# Patient Record
Sex: MALE | Race: WHITE | NOT HISPANIC OR LATINO | Employment: FULL TIME | URBAN - METROPOLITAN AREA
[De-identification: names, ages, dates, MRNs, and addresses within clinical notes are randomized per-mention and may not be internally consistent; named-entity substitution may affect disease eponyms.]

---

## 2019-06-03 ENCOUNTER — PATIENT OUTREACH (OUTPATIENT)
Dept: ADMINISTRATIVE | Facility: HOSPITAL | Age: 64
End: 2019-06-03

## 2019-06-04 ENCOUNTER — LAB VISIT (OUTPATIENT)
Dept: LAB | Facility: HOSPITAL | Age: 64
End: 2019-06-04
Payer: COMMERCIAL

## 2019-06-04 ENCOUNTER — OFFICE VISIT (OUTPATIENT)
Dept: INTERNAL MEDICINE | Facility: CLINIC | Age: 64
End: 2019-06-04
Payer: COMMERCIAL

## 2019-06-04 VITALS
TEMPERATURE: 98 F | DIASTOLIC BLOOD PRESSURE: 68 MMHG | HEIGHT: 68 IN | BODY MASS INDEX: 22.35 KG/M2 | WEIGHT: 147.5 LBS | SYSTOLIC BLOOD PRESSURE: 120 MMHG | HEART RATE: 82 BPM

## 2019-06-04 DIAGNOSIS — R35.0 URINARY FREQUENCY: ICD-10-CM

## 2019-06-04 DIAGNOSIS — R53.82 CHRONIC FATIGUE: ICD-10-CM

## 2019-06-04 DIAGNOSIS — L60.0 INGROWN TOENAIL OF RIGHT FOOT: ICD-10-CM

## 2019-06-04 DIAGNOSIS — Z76.89 ENCOUNTER TO ESTABLISH CARE: ICD-10-CM

## 2019-06-04 DIAGNOSIS — Z76.89 ENCOUNTER TO ESTABLISH CARE: Primary | ICD-10-CM

## 2019-06-04 LAB
25(OH)D3+25(OH)D2 SERPL-MCNC: 33 NG/ML (ref 30–96)
ALBUMIN SERPL BCP-MCNC: 4.3 G/DL (ref 3.5–5.2)
ALP SERPL-CCNC: 75 U/L (ref 55–135)
ALT SERPL W/O P-5'-P-CCNC: 47 U/L (ref 10–44)
ANION GAP SERPL CALC-SCNC: 8 MMOL/L (ref 8–16)
AST SERPL-CCNC: 34 U/L (ref 10–40)
BASOPHILS # BLD AUTO: 0.06 K/UL (ref 0–0.2)
BASOPHILS NFR BLD: 0.8 % (ref 0–1.9)
BILIRUB SERPL-MCNC: 0.9 MG/DL (ref 0.1–1)
BILIRUB SERPL-MCNC: NEGATIVE MG/DL
BLOOD URINE, POC: NEGATIVE
BNP SERPL-MCNC: 15 PG/ML (ref 0–99)
BUN SERPL-MCNC: 25 MG/DL (ref 8–23)
CALCIUM SERPL-MCNC: 10.6 MG/DL (ref 8.7–10.5)
CHLORIDE SERPL-SCNC: 100 MMOL/L (ref 95–110)
CHOLEST SERPL-MCNC: 237 MG/DL (ref 120–199)
CHOLEST/HDLC SERPL: 6.2 {RATIO} (ref 2–5)
CO2 SERPL-SCNC: 30 MMOL/L (ref 23–29)
COLOR, POC UA: NORMAL
COMPLEXED PSA SERPL-MCNC: 3.1 NG/ML (ref 0–4)
CREAT SERPL-MCNC: 1.2 MG/DL (ref 0.5–1.4)
CRP SERPL-MCNC: 2.3 MG/L (ref 0–8.2)
DIFFERENTIAL METHOD: ABNORMAL
EOSINOPHIL # BLD AUTO: 0.3 K/UL (ref 0–0.5)
EOSINOPHIL NFR BLD: 4.4 % (ref 0–8)
ERYTHROCYTE [DISTWIDTH] IN BLOOD BY AUTOMATED COUNT: 13.1 % (ref 11.5–14.5)
ERYTHROCYTE [SEDIMENTATION RATE] IN BLOOD BY WESTERGREN METHOD: 17 MM/HR (ref 0–23)
EST. GFR  (AFRICAN AMERICAN): >60 ML/MIN/1.73 M^2
EST. GFR  (NON AFRICAN AMERICAN): >60 ML/MIN/1.73 M^2
GLUCOSE SERPL-MCNC: 95 MG/DL (ref 70–110)
GLUCOSE UR QL STRIP: NORMAL
HCT VFR BLD AUTO: 39.7 % (ref 40–54)
HDLC SERPL-MCNC: 38 MG/DL (ref 40–75)
HDLC SERPL: 16 % (ref 20–50)
HGB BLD-MCNC: 13.1 G/DL (ref 14–18)
IMM GRANULOCYTES # BLD AUTO: 0.04 K/UL (ref 0–0.04)
IMM GRANULOCYTES NFR BLD AUTO: 0.5 % (ref 0–0.5)
KETONES UR QL STRIP: NEGATIVE
LDLC SERPL CALC-MCNC: 164.2 MG/DL (ref 63–159)
LEUKOCYTE ESTERASE URINE, POC: NEGATIVE
LYMPHOCYTES # BLD AUTO: 2.1 K/UL (ref 1–4.8)
LYMPHOCYTES NFR BLD: 26.5 % (ref 18–48)
MCH RBC QN AUTO: 29.7 PG (ref 27–31)
MCHC RBC AUTO-ENTMCNC: 33 G/DL (ref 32–36)
MCV RBC AUTO: 90 FL (ref 82–98)
MONOCYTES # BLD AUTO: 0.8 K/UL (ref 0.3–1)
MONOCYTES NFR BLD: 10 % (ref 4–15)
NEUTROPHILS # BLD AUTO: 4.5 K/UL (ref 1.8–7.7)
NEUTROPHILS NFR BLD: 57.8 % (ref 38–73)
NITRITE, POC UA: NEGATIVE
NONHDLC SERPL-MCNC: 199 MG/DL
NRBC BLD-RTO: 0 /100 WBC
PH, POC UA: 9
PLATELET # BLD AUTO: 245 K/UL (ref 150–350)
PMV BLD AUTO: 11.1 FL (ref 9.2–12.9)
POTASSIUM SERPL-SCNC: 4.1 MMOL/L (ref 3.5–5.1)
PROT SERPL-MCNC: 8.1 G/DL (ref 6–8.4)
PROTEIN, POC: NORMAL
RBC # BLD AUTO: 4.41 M/UL (ref 4.6–6.2)
SODIUM SERPL-SCNC: 138 MMOL/L (ref 136–145)
SPECIFIC GRAVITY, POC UA: 1
T4 FREE SERPL-MCNC: 0.96 NG/DL (ref 0.71–1.51)
TRIGL SERPL-MCNC: 174 MG/DL (ref 30–150)
TSH SERPL DL<=0.005 MIU/L-ACNC: 2.36 UIU/ML (ref 0.4–4)
UROBILINOGEN, POC UA: NORMAL
WBC # BLD AUTO: 7.81 K/UL (ref 3.9–12.7)

## 2019-06-04 PROCEDURE — 99999 PR PBB SHADOW E&M-EST. PATIENT-LVL III: ICD-10-PCS | Mod: PBBFAC,,, | Performed by: INTERNAL MEDICINE

## 2019-06-04 PROCEDURE — 81001 POCT URINALYSIS, DIPSTICK OR TABLET REAGENT, AUTOMATED, WITH MICROSCOP: ICD-10-PCS | Mod: S$GLB,,, | Performed by: INTERNAL MEDICINE

## 2019-06-04 PROCEDURE — 86803 HEPATITIS C AB TEST: CPT

## 2019-06-04 PROCEDURE — 83880 ASSAY OF NATRIURETIC PEPTIDE: CPT

## 2019-06-04 PROCEDURE — 82040 ASSAY OF SERUM ALBUMIN: CPT

## 2019-06-04 PROCEDURE — 36415 COLL VENOUS BLD VENIPUNCTURE: CPT | Mod: PO

## 2019-06-04 PROCEDURE — 84443 ASSAY THYROID STIM HORMONE: CPT

## 2019-06-04 PROCEDURE — 99999 PR PBB SHADOW E&M-EST. PATIENT-LVL III: CPT | Mod: PBBFAC,,, | Performed by: INTERNAL MEDICINE

## 2019-06-04 PROCEDURE — 84439 ASSAY OF FREE THYROXINE: CPT

## 2019-06-04 PROCEDURE — 99204 OFFICE O/P NEW MOD 45 MIN: CPT | Mod: 25,S$GLB,, | Performed by: INTERNAL MEDICINE

## 2019-06-04 PROCEDURE — 82306 VITAMIN D 25 HYDROXY: CPT

## 2019-06-04 PROCEDURE — 85652 RBC SED RATE AUTOMATED: CPT

## 2019-06-04 PROCEDURE — 99204 PR OFFICE/OUTPT VISIT, NEW, LEVL IV, 45-59 MIN: ICD-10-PCS | Mod: 25,S$GLB,, | Performed by: INTERNAL MEDICINE

## 2019-06-04 PROCEDURE — 84153 ASSAY OF PSA TOTAL: CPT

## 2019-06-04 PROCEDURE — 80061 LIPID PANEL: CPT

## 2019-06-04 PROCEDURE — 86140 C-REACTIVE PROTEIN: CPT

## 2019-06-04 PROCEDURE — 87086 URINE CULTURE/COLONY COUNT: CPT

## 2019-06-04 PROCEDURE — 85025 COMPLETE CBC W/AUTO DIFF WBC: CPT

## 2019-06-04 PROCEDURE — 80053 COMPREHEN METABOLIC PANEL: CPT

## 2019-06-04 PROCEDURE — 81001 URINALYSIS AUTO W/SCOPE: CPT | Mod: S$GLB,,, | Performed by: INTERNAL MEDICINE

## 2019-06-04 RX ORDER — COD LIVER OIL
OIL (ML) ORAL 2 TIMES DAILY
COMMUNITY
End: 2020-01-06

## 2019-06-04 NOTE — PROGRESS NOTES
Subjective:      Patient ID: Aleksandr Singh is a 64 y.o. male.    Chief Complaint: Establish Care      Mr. Aleksandr Singh is a patient of Brown Nicholson Jr, MD, who presents to [facility] primary care.    HPI    He reports having an ingrown toenail on bilateral feet, but on the right it broke the skin.      Past Medical History:   Diagnosis Date    Cholesteatoma of attic, left ear     Hearing loss     left    History of prostatitis     Otitis media     s/p middle ear removed     Seasonal allergies     post-nasal drip; controlled with Claritin; avoids dairy & wheat     Past Surgical History:   Procedure Laterality Date    ADENOIDECTOMY Bilateral 1975    MASTOID SURGERY Left 1976    TONSILLECTOMY Bilateral 1975     Social History     Socioeconomic History    Marital status: Single     Spouse name: Not on file    Number of children: Not on file    Years of education: Not on file    Highest education level: Not on file   Occupational History    Not on file   Social Needs    Financial resource strain: Not on file    Food insecurity:     Worry: Not on file     Inability: Not on file    Transportation needs:     Medical: Not on file     Non-medical: Not on file   Tobacco Use    Smoking status: Never Smoker    Smokeless tobacco: Never Used   Substance and Sexual Activity    Alcohol use: Not Currently     Alcohol/week: 0.6 oz     Types: 1 Glasses of wine per week     Frequency: Monthly or less     Comment: occasional     Drug use: No    Sexual activity: Not Currently   Lifestyle    Physical activity:     Days per week: Not on file     Minutes per session: Not on file    Stress: Not at all   Relationships    Social connections:     Talks on phone: Not on file     Gets together: Not on file     Attends Scientologist service: Not on file     Active member of club or organization: Not on file     Attends meetings of clubs or organizations: Not on file     Relationship status: Not on file   Other Topics Concern     Not on file   Social History Narrative    Health Goals: Increase energy, started anti-fungal diet due to plaque psoriasis, for which he takes Cod liver oil        Breakfast: Smoothies: fruit, banana    Lunch: Lentils (home made) with turkey, spices or beans (red); water (previously black tea)    Dinner: Chicken salad or gyro at [employer]; water    Snacks: None    Eating out: 3x/wk    Water (oz/day): 24 oz/d    Physical Activity: None     Goals     None        Family History   Problem Relation Age of Onset    Melanoma Father     Heart failure Brother         s/p CABG x3    Skin cancer Brother     Heart attack Brother 67       Current Outpatient Medications:     cod liver oil Oil, Take by mouth 2 (two) times daily., Disp: , Rfl:     Lactobacillus rhamnosus GG (CULTURELLE) 10 billion cell capsule, Take 1 capsule by mouth once daily., Disp: , Rfl:     Review of patient's allergies indicates:   Allergen Reactions    Keflex [cephalexin] Shortness Of Breath    Terbinafine Palpitations       Review of Systems   Constitutional: Negative for activity change and unexpected weight change.   HENT: Negative for hearing loss, rhinorrhea and trouble swallowing.    Eyes: Negative for discharge and visual disturbance.   Respiratory: Negative for chest tightness and wheezing.    Cardiovascular: Negative for chest pain and palpitations.   Gastrointestinal: Negative for blood in stool, constipation, diarrhea and vomiting.   Endocrine: Positive for polyuria. Negative for polydipsia.   Genitourinary: Positive for difficulty urinating and urgency. Negative for hematuria.   Musculoskeletal: Negative for arthralgias, joint swelling and neck pain.   Neurological: Negative for weakness and headaches.   Psychiatric/Behavioral: Negative for confusion and dysphoric mood.      All remaining systems negative    Objective:     /68 (BP Location: Left arm, Patient Position: Sitting, BP Method: Medium (Automatic))   Pulse 82    "Temp 98 °F (36.7 °C) (Tympanic)   Ht 5' 8" (1.727 m)   Wt 66.9 kg (147 lb 7.8 oz)   BMI 22.43 kg/m²     Physical Exam  GEN: A&O fully, NAD  PSYC: Normal affect  EXT: Right 1st tarsal with cut away wedge and with erythematous, edematous flesh underneath, no cellulitis of surrounding area      Assessment:      1. Encounter to establish care    2. Chronic fatigue: Improving with dietary modifications.     3. Ingrown toenail of right foot: Will refer to podiatry for possible surgery.        Plan:   Encounter to establish care  -     Testosterone Panel; Future; Expected date: 06/04/2019  -     CBC auto differential; Future; Expected date: 06/04/2019  -     Comprehensive metabolic panel; Future; Expected date: 06/04/2019  -     C-reactive protein; Future; Expected date: 06/04/2019  -     Sedimentation rate; Future; Expected date: 06/04/2019  -     Lipid panel; Future; Expected date: 06/04/2019  -     Vitamin D; Future; Expected date: 06/04/2019  -     Brain natriuretic peptide; Future; Expected date: 06/04/2019  -     T4, free; Future; Expected date: 06/04/2019  -     TSH; Future; Expected date: 06/04/2019  -     Hepatitis C antibody; Future; Expected date: 06/04/2019  -     PSA, Screening; Future; Expected date: 06/04/2019  -     Case request GI: COLONOSCOPY    Chronic fatigue    Ingrown toenail of right foot  -     Ambulatory consult to Podiatry        Follow up in about 3 months (around 9/4/2019), or if symptoms worsen or fail to improve, for FU on fatigue.      I spent 45 minutes of time with patient 50% or more of which was discussing labs and plans of care.  "

## 2019-06-05 LAB — HCV AB SERPL QL IA: NEGATIVE

## 2019-06-06 DIAGNOSIS — E83.52 HYPERCALCEMIA: Primary | ICD-10-CM

## 2019-06-06 DIAGNOSIS — D64.9 ANEMIA, UNSPECIFIED TYPE: ICD-10-CM

## 2019-06-06 PROBLEM — E78.5 DYSLIPIDEMIA (HIGH LDL; LOW HDL): Status: ACTIVE | Noted: 2019-06-06

## 2019-06-06 LAB — BACTERIA UR CULT: NO GROWTH

## 2019-06-10 ENCOUNTER — TELEPHONE (OUTPATIENT)
Dept: INTERNAL MEDICINE | Facility: CLINIC | Age: 64
End: 2019-06-10

## 2019-06-10 LAB
ALBUMIN SERPL-MCNC: 4.8 G/DL (ref 3.6–5.1)
SHBG SERPL-SCNC: 35 NMOL/L (ref 22–77)
TESTOST FREE SERPL-MCNC: 47 PG/ML (ref 46–224)
TESTOST SERPL-MCNC: 386 NG/DL (ref 250–1100)
TESTOSTERONE.FREE+WB SERPL-MCNC: 102.9 NG/DL (ref 110–575)

## 2019-06-10 NOTE — TELEPHONE ENCOUNTER
----- Message from Liza Mcclellan sent at 6/10/2019 10:05 AM CDT -----  Type:  Patient Returning Call    Who Called: Kang Brandon  Who Left Message for Patient:  Dr Skinner's office  Does the patient know what this is regarding?:   Would the patient rather a call back or a response via [facility]?  Call back  Best Call Back Number: [phone]  Additional Information:  Please call again//[email or web address]

## 2019-06-25 ENCOUNTER — OFFICE VISIT (OUTPATIENT)
Dept: PODIATRY | Facility: CLINIC | Age: 64
End: 2019-06-25
Payer: COMMERCIAL

## 2019-06-25 VITALS
SYSTOLIC BLOOD PRESSURE: 108 MMHG | HEART RATE: 72 BPM | HEIGHT: 68 IN | DIASTOLIC BLOOD PRESSURE: 70 MMHG | BODY MASS INDEX: 22.49 KG/M2 | WEIGHT: 148.38 LBS

## 2019-06-25 DIAGNOSIS — L60.0 ONYCHOCRYPTOSIS: ICD-10-CM

## 2019-06-25 DIAGNOSIS — L03.039 ACUTE PARONYCHIA OF TOE, UNSPECIFIED LATERALITY: Primary | ICD-10-CM

## 2019-06-25 PROCEDURE — 99999 PR PBB SHADOW E&M-EST. PATIENT-LVL III: CPT | Mod: PBBFAC,,, | Performed by: PODIATRIST

## 2019-06-25 PROCEDURE — 99203 OFFICE O/P NEW LOW 30 MIN: CPT | Mod: S$GLB,,, | Performed by: PODIATRIST

## 2019-06-25 PROCEDURE — 99203 PR OFFICE/OUTPT VISIT, NEW, LEVL III, 30-44 MIN: ICD-10-PCS | Mod: S$GLB,,, | Performed by: PODIATRIST

## 2019-06-25 PROCEDURE — 99999 PR PBB SHADOW E&M-EST. PATIENT-LVL III: ICD-10-PCS | Mod: PBBFAC,,, | Performed by: PODIATRIST

## 2019-06-25 NOTE — LETTER
June 30, 2019      Lito Skinner MD  [address]  [place] [address]           [facility]  [address] [place] [address]  Phone: [phone]  Fax: [phone]          Patient: Aleksandr Singh   MR Number: [id number]   YOB: 1955   Date of Visit: 6/25/2019       Dear Dr. Lito Skinner:    Thank you for referring Aleksandr Singh to me for evaluation. Attached you will find relevant portions of my assessment and plan of care.    If you have questions, please do not hesitate to call me. I look forward to following Aleksandr Singh along with you.    Sincerely,    Vlad Penaloza DPM    Enclosure  CC:  No Recipients    If you would like to receive this communication electronically, please contact [email or web address] or [phone] to request more information on [facility] access.    For providers and/or their staff who would like to refer a patient to [facility], please contact us through our one-stop-shop provider referral line, [facility], at [phone].    If you feel you have received this communication in error or would no longer like to receive these types of communications, please e-mail [email or web address]

## 2019-06-30 NOTE — PROGRESS NOTES
Subjective:      Patient ID: Aleksandr Singh is a 64 y.o. male.    Chief Complaint: Ingrown Toenail (Ingrown toenail to bilat hallux,  wears tennis shoes, pain 0/10, ambulatory without assistance, non-diabetic, PCP: Lyn Ford  )    Aleksandr is a 64 y.o. male who presents to the clinic complaining of painful ingrown toenail on both feet. Patient states the nail feels like its digging into the side of his skin. Patient states the pain has been present for several months. Patient does not relate a history of trauma. Patient rates pain 0/10. When asked to indicate where the pain is located, patient points to medial bilateral nail border. Patient denies other complaints at this time.      Patient Active Problem List   Diagnosis    Hypercalcemia    Elevated BUN    Anemia    Dyslipidemia (high LDL; low HDL)       Medication List with Changes/Refills   Current Medications    COD LIVER OIL OIL    Take by mouth 2 (two) times daily.    LACTOBACILLUS RHAMNOSUS GG (CULTURELLE) 10 BILLION CELL CAPSULE    Take 1 capsule by mouth once daily.       Review of patient's allergies indicates:   Allergen Reactions    Keflex [cephalexin] Shortness Of Breath    Terbinafine Palpitations       Past Surgical History:   Procedure Laterality Date    ADENOIDECTOMY Bilateral 1975    MASTOID SURGERY Left 1976    TONSILLECTOMY Bilateral 1975       Family History   Problem Relation Age of Onset    Melanoma Father     Heart failure Brother         s/p CABG x3    Skin cancer Brother     Heart attack Brother 67       Social History     Socioeconomic History    Marital status: Single     Spouse name: Not on file    Number of children: Not on file    Years of education: Not on file    Highest education level: Not on file   Occupational History    Not on file   Social Needs    Financial resource strain: Not on file    Food insecurity:     Worry: Not on file     Inability: Not on file    Transportation needs:     Medical: Not on file      "Non-medical: Not on file   Tobacco Use    Smoking status: Never Smoker    Smokeless tobacco: Never Used   Substance and Sexual Activity    Alcohol use: Not Currently     Alcohol/week: 0.6 oz     Types: 1 Glasses of wine per week     Frequency: Monthly or less     Comment: occasional     Drug use: No    Sexual activity: Not Currently   Lifestyle    Physical activity:     Days per week: Not on file     Minutes per session: Not on file    Stress: Not at all   Relationships    Social connections:     Talks on phone: Not on file     Gets together: Not on file     Attends Hindu service: Not on file     Active member of club or organization: Not on file     Attends meetings of clubs or organizations: Not on file     Relationship status: Not on file   Other Topics Concern    Not on file   Social History Narrative    Health Goals: Increase energy, started anti-fungal diet due to plaque psoriasis, for which he takes Cod liver oil        Breakfast: Smoothies: fruit, banana    Lunch: Lentils (home made) with turkey, spices or beans (red); water (previously black tea)    Dinner: Chicken salad or gyro at [employer]; water    Snacks: None    Eating out: 3x/wk    Water (oz/day): 24 oz/d    Physical Activity: None       Vitals:    06/25/19 1602   BP: 108/70   Pulse: 72   Weight: 67.3 kg (148 lb 5.9 oz)   Height: 5' 8" (1.727 m)   PainSc: 0-No pain       Review of Systems   Constitutional: Negative for chills and fever.   Respiratory: Negative for shortness of breath.    Cardiovascular: Negative for chest pain, palpitations, orthopnea, claudication and leg swelling.   Gastrointestinal: Negative for diarrhea, nausea and vomiting.   Musculoskeletal: Negative for joint pain.   Skin: Negative for rash.   Neurological: Negative for dizziness, tingling, sensory change, focal weakness and weakness.   Psychiatric/Behavioral: Negative.          Objective:   PHYSICAL EXAM: Apperance: Alert and orient in no distress,well " developed, and with good attention to grooming and body habits  Lower Extremity Exam   VASCULAR: Dorsalis pedis pulses 2/4 bilateral and Posterior Tibial pulses 2/4 bilateral. Capillary fill time <4 seconds bilateral. No edema observed bilateral. Varicosities absent bilateral. Skin temperature of the lower extremities is warm to warm, proximal to distal. Hair growth WNL bilateral.  DERMATOLOGICAL: No skin rash, subcutaneous nodules, lesions or ulcers observed. Bilateral hallux nail observed to be mildly incurvated at medial borders and slight obstructed in the nail grooves with soft tissue. The bilateral hallux medial nail fold is grossly edematous and firm from chronic inflammation and scarring. No purulent drainage, no odor, and no increased temperature observed to bilateral hallux.   NEUROLOGICAL: Light touch, sharp-dull, proprioception all present and equal bilaterally.    MUSCULOSKELETAL: Muscle strength 5/5 for all foot inverters, everters, plantarflexors, and  dorsiflexors bilateral. No pain on palpation of bilateral hallux medial nail border. No pain on palpation of dorsal nail plate bilateral hallux.         Assessment:       Acute paronychia of toe, unspecified laterality    Onychocryptosis          Plan:   Acute paronychia of toe, unspecified laterality    Onychocryptosis      I counseled the patient on his conditions, regarding findings of my examination, my impressions, and usual treatment plan.   Conservatively we did discuss proper nail cutting for incurvated toenails. Surgically we briefly discussed temporary vs. permanent nail avulsion procedures with patient. The patient elects for conservative management at this time.   Patient to contact office when ready to proceed with nail removal.             Vlad Penaloza DPM  [facility]

## 2019-07-30 PROBLEM — Z12.11 COLON CANCER SCREENING: Status: ACTIVE | Noted: 2019-07-30

## 2019-08-21 ENCOUNTER — PATIENT OUTREACH (OUTPATIENT)
Dept: ADMINISTRATIVE | Facility: HOSPITAL | Age: 64
End: 2019-08-21

## 2019-09-04 ENCOUNTER — LAB VISIT (OUTPATIENT)
Dept: LAB | Facility: HOSPITAL | Age: 64
End: 2019-09-04
Attending: INTERNAL MEDICINE
Payer: COMMERCIAL

## 2019-09-04 ENCOUNTER — OFFICE VISIT (OUTPATIENT)
Dept: INTERNAL MEDICINE | Facility: CLINIC | Age: 64
End: 2019-09-04
Payer: COMMERCIAL

## 2019-09-04 VITALS
TEMPERATURE: 98 F | DIASTOLIC BLOOD PRESSURE: 62 MMHG | BODY MASS INDEX: 22.49 KG/M2 | HEART RATE: 70 BPM | SYSTOLIC BLOOD PRESSURE: 122 MMHG | HEIGHT: 68 IN | OXYGEN SATURATION: 97 % | WEIGHT: 148.38 LBS

## 2019-09-04 DIAGNOSIS — R79.9 ELEVATED BUN: ICD-10-CM

## 2019-09-04 DIAGNOSIS — E78.5 DYSLIPIDEMIA (HIGH LDL; LOW HDL): ICD-10-CM

## 2019-09-04 DIAGNOSIS — D64.9 ANEMIA, UNSPECIFIED TYPE: ICD-10-CM

## 2019-09-04 DIAGNOSIS — E83.52 HYPERCALCEMIA: ICD-10-CM

## 2019-09-04 DIAGNOSIS — R74.01 TRANSAMINITIS: ICD-10-CM

## 2019-09-04 DIAGNOSIS — R74.01 TRANSAMINITIS: Primary | ICD-10-CM

## 2019-09-04 LAB
ALT SERPL W/O P-5'-P-CCNC: 23 U/L (ref 10–44)
ANION GAP SERPL CALC-SCNC: 11 MMOL/L (ref 8–16)
BUN SERPL-MCNC: 23 MG/DL (ref 8–23)
CALCIUM SERPL-MCNC: 9.6 MG/DL (ref 8.7–10.5)
CHLORIDE SERPL-SCNC: 102 MMOL/L (ref 95–110)
CHOLEST SERPL-MCNC: 210 MG/DL (ref 120–199)
CHOLEST/HDLC SERPL: 5.1 {RATIO} (ref 2–5)
CO2 SERPL-SCNC: 27 MMOL/L (ref 23–29)
CREAT SERPL-MCNC: 0.9 MG/DL (ref 0.5–1.4)
ERYTHROCYTE [DISTWIDTH] IN BLOOD BY AUTOMATED COUNT: 13.4 % (ref 11.5–14.5)
EST. GFR  (AFRICAN AMERICAN): >60 ML/MIN/1.73 M^2
EST. GFR  (NON AFRICAN AMERICAN): >60 ML/MIN/1.73 M^2
GLUCOSE SERPL-MCNC: 78 MG/DL (ref 70–110)
HCT VFR BLD AUTO: 40.9 % (ref 40–54)
HDLC SERPL-MCNC: 41 MG/DL (ref 40–75)
HDLC SERPL: 19.5 % (ref 20–50)
HGB BLD-MCNC: 13.3 G/DL (ref 14–18)
IRON SERPL-MCNC: 84 UG/DL (ref 45–160)
LDLC SERPL CALC-MCNC: 144 MG/DL (ref 63–159)
MCH RBC QN AUTO: 29 PG (ref 27–31)
MCHC RBC AUTO-ENTMCNC: 32.5 G/DL (ref 32–36)
MCV RBC AUTO: 89 FL (ref 82–98)
NONHDLC SERPL-MCNC: 169 MG/DL
PLATELET # BLD AUTO: 218 K/UL (ref 150–350)
PMV BLD AUTO: 11.3 FL (ref 9.2–12.9)
POTASSIUM SERPL-SCNC: 3.7 MMOL/L (ref 3.5–5.1)
RBC # BLD AUTO: 4.58 M/UL (ref 4.6–6.2)
SATURATED IRON: 20 % (ref 20–50)
SODIUM SERPL-SCNC: 140 MMOL/L (ref 136–145)
TOTAL IRON BINDING CAPACITY: 422 UG/DL (ref 250–450)
TRANSFERRIN SERPL-MCNC: 285 MG/DL (ref 200–375)
TRIGL SERPL-MCNC: 125 MG/DL (ref 30–150)
WBC # BLD AUTO: 8.37 K/UL (ref 3.9–12.7)

## 2019-09-04 PROCEDURE — 83540 ASSAY OF IRON: CPT

## 2019-09-04 PROCEDURE — 99999 PR PBB SHADOW E&M-EST. PATIENT-LVL III: CPT | Mod: PBBFAC,,, | Performed by: INTERNAL MEDICINE

## 2019-09-04 PROCEDURE — 99214 OFFICE O/P EST MOD 30 MIN: CPT | Mod: S$GLB,,, | Performed by: INTERNAL MEDICINE

## 2019-09-04 PROCEDURE — 99999 PR PBB SHADOW E&M-EST. PATIENT-LVL III: ICD-10-PCS | Mod: PBBFAC,,, | Performed by: INTERNAL MEDICINE

## 2019-09-04 PROCEDURE — 36415 COLL VENOUS BLD VENIPUNCTURE: CPT | Mod: PO

## 2019-09-04 PROCEDURE — 80048 BASIC METABOLIC PNL TOTAL CA: CPT

## 2019-09-04 PROCEDURE — 85027 COMPLETE CBC AUTOMATED: CPT

## 2019-09-04 PROCEDURE — 99214 PR OFFICE/OUTPT VISIT, EST, LEVL IV, 30-39 MIN: ICD-10-PCS | Mod: S$GLB,,, | Performed by: INTERNAL MEDICINE

## 2019-09-04 PROCEDURE — 83970 ASSAY OF PARATHORMONE: CPT

## 2019-09-04 PROCEDURE — 84460 ALANINE AMINO (ALT) (SGPT): CPT

## 2019-09-04 PROCEDURE — 80061 LIPID PANEL: CPT

## 2019-09-04 PROCEDURE — 82728 ASSAY OF FERRITIN: CPT

## 2019-09-04 NOTE — PROGRESS NOTES
Subjective:      Patient ID: Aleksandr Singh is a 64 y.o. male.    Chief Complaint: Follow-up      HPI     MrFelipe Singh is a patient of Brown Nicholson Jr, MD, who presents for Follow-up    on chronic fatigue. He continues to have occasional muscle soreness and joint pains in various areas.     He reports an improvement in his fatigue ever since cutting back on refined carbs and dairy. His sinuses, skin and enlarged prostate have also improved since. He plans to start cross-fit in October.     VS, labs & imaging reviewed and discussed with patient, including Ca 10.6, BUN 25, ALT 47, , HDL 38, .2, H&H 13.1/39.7, MCV 90, T/BNP/TFTs all WNL on 6/4/19. He reports taking Cod liver oil.     Of note, EPIC indicates due preventive measures, including shingles & colonoscopy.       Past Medical History:   Diagnosis Date    Anemia     mild; will w/u    Cholesteatoma of attic, left ear     Dyslipidemia (high LDL; low HDL) 06/06/2019    Elevated BUN     Hearing loss     left    History of prostatitis     Hypercalcemia     Otitis media     s/p middle ear removed     Seasonal allergies     post-nasal drip; controlled with Claritin; avoids dairy & wheat     Past Surgical History:   Procedure Laterality Date    ADENOIDECTOMY Bilateral 1975    MASTOID SURGERY Left 1976    TONSILLECTOMY Bilateral 1975     Social History     Socioeconomic History    Marital status: Single     Spouse name: Not on file    Number of children: Not on file    Years of education: Not on file    Highest education level: Not on file   Occupational History    Not on file   Social Needs    Financial resource strain: Not on file    Food insecurity:     Worry: Not on file     Inability: Not on file    Transportation needs:     Medical: Not on file     Non-medical: Not on file   Tobacco Use    Smoking status: Never Smoker    Smokeless tobacco: Never Used   Substance and Sexual Activity    Alcohol use: Not Currently      "Alcohol/week: 0.6 oz     Types: 1 Glasses of wine per week     Frequency: Monthly or less     Comment: occasional     Drug use: No    Sexual activity: Not Currently   Lifestyle    Physical activity:     Days per week: Not on file     Minutes per session: Not on file    Stress: Not at all   Relationships    Social connections:     Talks on phone: Not on file     Gets together: Not on file     Attends Druze service: Not on file     Active member of club or organization: Not on file     Attends meetings of clubs or organizations: Not on file     Relationship status: Not on file   Other Topics Concern    Not on file   Social History Narrative    Health Goals: Increase energy, started anti-fungal diet due to plaque psoriasis, for which he takes Cod liver oil        Breakfast: Smoothies: fruit, banana    Lunch: Lentils (home made) with turkey, spices or beans (red); water (previously black tea)    Dinner: Chicken salad or gyro at [employer]; water    Snacks: None    Eating out: 3x/wk    Water (oz/day): 24 oz/d    Physical Activity: None     Family History   Problem Relation Age of Onset    Melanoma Father     Heart failure Brother         s/p CABG x3    Skin cancer Brother     Heart attack Brother 67       Current Outpatient Medications:     cod liver oil Oil, Take by mouth 2 (two) times daily., Disp: , Rfl:     Lactobacillus rhamnosus GG (CULTURELLE) 10 billion cell capsule, Take 1 capsule by mouth once daily., Disp: , Rfl:     Review of patient's allergies indicates:   Allergen Reactions    Keflex [cephalexin] Shortness Of Breath    Terbinafine Palpitations        Review of Systems   All remaining systems negative    Objective:     /62 (BP Location: Left arm, Patient Position: Sitting, BP Method: Large (Manual))   Pulse 70   Temp 98.3 °F (36.8 °C) (Temporal)   Ht 5' 8" (1.727 m)   Wt 67.3 kg (148 lb 5.9 oz)   SpO2 97%   BMI 22.56 kg/m²     Physical Exam  GEN: A&O fully, NAD  PSYC: " Normal affect      Lab Results   Component Value Date    WBC 7.81 06/04/2019    HGB 13.1 (L) 06/04/2019    HCT 39.7 (L) 06/04/2019     06/04/2019    CHOL 237 (H) 06/04/2019    TRIG 174 (H) 06/04/2019    HDL 38 (L) 06/04/2019    LDLCALC 164.2 (H) 06/04/2019    ALT 47 (H) 06/04/2019    AST 34 06/04/2019     06/04/2019    K 4.1 06/04/2019     06/04/2019    CREATININE 1.2 06/04/2019    BUN 25 (H) 06/04/2019    CO2 30 (H) 06/04/2019    CALCIUM 10.6 (H) 06/04/2019    PSA 3.1 06/04/2019       Assessment:      1. Transaminitis: Mild. Likely 2/2 previous fast foods, which he has eliminated. Will recheck today.    2. Hypercalcemia: Mild. May be 2/2 supplementation. Encouraged to avoid all food/drink products & supplements with vitamin D or Ca for now.    3. Dyslipidemia (high LDL; low HDL): Will recheck today. Likely will be improved with good diet.   4. Anemia, unspecified type: Fe studies already ordered, which we will obtain today.    5. Elevated BUN: Likely 2/2 chronic dehydration. He has increased water intake to 64 oz/day. Encouraged to increase further to 80 oz/day.    6. HM: Encouraged shingles & colonoscopy.       Plan:   Transaminitis  -     ALT (SGPT); Future; Expected date: 09/04/2019    Hypercalcemia    Dyslipidemia (high LDL; low HDL)  -     Lipid panel; Future; Expected date: 09/04/2019    Anemia, unspecified type    Elevated BUN      Follow up in about 4 months (around 1/4/2020), or if symptoms worsen or fail to improve, for FU on hypercalcemia.    I spent >25 minutes of time with patient 50% or more of which was discussing labs and plans of care.

## 2019-09-05 DIAGNOSIS — E21.3 HYPERPARATHYROIDISM: Primary | ICD-10-CM

## 2019-09-05 LAB
FERRITIN SERPL-MCNC: 124 NG/ML (ref 20–300)
PTH-INTACT SERPL-MCNC: 83 PG/ML (ref 9–77)

## 2019-09-26 ENCOUNTER — LAB VISIT (OUTPATIENT)
Dept: LAB | Facility: HOSPITAL | Age: 64
End: 2019-09-26
Attending: INTERNAL MEDICINE
Payer: COMMERCIAL

## 2019-09-26 DIAGNOSIS — E21.3 HYPERPARATHYROIDISM: ICD-10-CM

## 2019-09-26 LAB
ANION GAP SERPL CALC-SCNC: 10 MMOL/L (ref 8–16)
BUN SERPL-MCNC: 23 MG/DL (ref 8–23)
CALCIUM SERPL-MCNC: 9.9 MG/DL (ref 8.7–10.5)
CHLORIDE SERPL-SCNC: 100 MMOL/L (ref 95–110)
CO2 SERPL-SCNC: 28 MMOL/L (ref 23–29)
CREAT SERPL-MCNC: 1.1 MG/DL (ref 0.5–1.4)
EST. GFR  (AFRICAN AMERICAN): >60 ML/MIN/1.73 M^2
EST. GFR  (NON AFRICAN AMERICAN): >60 ML/MIN/1.73 M^2
GLUCOSE SERPL-MCNC: 85 MG/DL (ref 70–110)
PHOSPHATE SERPL-MCNC: 4 MG/DL (ref 2.7–4.5)
POTASSIUM SERPL-SCNC: 4 MMOL/L (ref 3.5–5.1)
PTH-INTACT SERPL-MCNC: 50 PG/ML (ref 9–77)
SODIUM SERPL-SCNC: 138 MMOL/L (ref 136–145)

## 2019-09-26 PROCEDURE — 80048 BASIC METABOLIC PNL TOTAL CA: CPT

## 2019-09-26 PROCEDURE — 36415 COLL VENOUS BLD VENIPUNCTURE: CPT | Mod: PO

## 2019-09-26 PROCEDURE — 83970 ASSAY OF PARATHORMONE: CPT

## 2019-09-26 PROCEDURE — 84100 ASSAY OF PHOSPHORUS: CPT

## 2020-01-06 ENCOUNTER — OFFICE VISIT (OUTPATIENT)
Dept: INTERNAL MEDICINE | Facility: CLINIC | Age: 65
End: 2020-01-06
Payer: COMMERCIAL

## 2020-01-06 VITALS
SYSTOLIC BLOOD PRESSURE: 122 MMHG | BODY MASS INDEX: 22.19 KG/M2 | WEIGHT: 146.38 LBS | DIASTOLIC BLOOD PRESSURE: 68 MMHG | HEIGHT: 68 IN | TEMPERATURE: 98 F

## 2020-01-06 DIAGNOSIS — R39.15 URINARY URGENCY: Primary | ICD-10-CM

## 2020-01-06 LAB
BILIRUB SERPL-MCNC: NEGATIVE MG/DL
BLOOD, POC UA: NORMAL
GLUCOSE UR QL STRIP: NORMAL
KETONES UR QL STRIP: NEGATIVE
LEUKOCYTE ESTERASE URINE, POC: NORMAL
NITRITE, POC UA: NEGATIVE
PH, POC UA: 6
PROTEIN, POC: NEGATIVE
SPECIFIC GRAVITY, POC UA: 1.02
UROBILINOGEN, POC UA: NORMAL

## 2020-01-06 PROCEDURE — 81003 URINALYSIS AUTO W/O SCOPE: CPT | Mod: QW,S$GLB,, | Performed by: INTERNAL MEDICINE

## 2020-01-06 PROCEDURE — 99999 PR PBB SHADOW E&M-EST. PATIENT-LVL III: ICD-10-PCS | Mod: PBBFAC,,, | Performed by: INTERNAL MEDICINE

## 2020-01-06 PROCEDURE — 99214 PR OFFICE/OUTPT VISIT, EST, LEVL IV, 30-39 MIN: ICD-10-PCS | Mod: 25,S$GLB,, | Performed by: INTERNAL MEDICINE

## 2020-01-06 PROCEDURE — 87086 URINE CULTURE/COLONY COUNT: CPT

## 2020-01-06 PROCEDURE — 81003 POCT URINALYSIS: ICD-10-PCS | Mod: QW,S$GLB,, | Performed by: INTERNAL MEDICINE

## 2020-01-06 PROCEDURE — 99214 OFFICE O/P EST MOD 30 MIN: CPT | Mod: 25,S$GLB,, | Performed by: INTERNAL MEDICINE

## 2020-01-06 PROCEDURE — 99999 PR PBB SHADOW E&M-EST. PATIENT-LVL III: CPT | Mod: PBBFAC,,, | Performed by: INTERNAL MEDICINE

## 2020-01-06 NOTE — PROGRESS NOTES
Subjective:      Patient ID: Aleksandr Singh is a 64 y.o. male.    Chief Complaint: Follow-up      HPI     Mr. Aleksandr Singh is a patient of Brown Nicholson Jr, MD, who presents for Follow-up    He reports having urinary urgency and pain with urination. No pain in prostate gland region or testicles or scrotum.     VS, labs & imaging reviewed and discussed with patient, including PTH 50 (PTH 83.0 on 9/4/19),  mg/dL, o/w WNL on 9/26/19; PSA 3.1 on 6/4/19.    Ca 10.6, BUN 25, ALT 47, , HDL 38, .2, H&H 13.1/39.7, MCV 90, T/BNP/TFTs all WNL on 6/4/19. He reports taking Cod liver oil.      Of note, EPIC indicates due preventive measures, including shingles & FLU.       Past Medical History:   Diagnosis Date    Anemia     mild; will w/u    Cholesteatoma of attic, left ear     Dyslipidemia (high LDL; low HDL) 06/06/2019    Elevated BUN     Hearing loss     left    History of hypercalcemia 9/29/19    Was found to have high PTH & Ca on a high fast-food diet, all of which normalized with a higher whole foods diet    History of hyperparathyroidism 09/27/2019    Resolved following switch from high fast food to high whole food diet    History of prostatitis     Otitis media     s/p middle ear removed     Seasonal allergies     post-nasal drip; controlled with Claritin; avoids dairy & wheat     Past Surgical History:   Procedure Laterality Date    ADENOIDECTOMY Bilateral 1975    MASTOID SURGERY Left 1976    TONSILLECTOMY Bilateral 1975     Social History     Socioeconomic History    Marital status: Single     Spouse name: Not on file    Number of children: Not on file    Years of education: Not on file    Highest education level: Not on file   Occupational History    Not on file   Social Needs    Financial resource strain: Not on file    Food insecurity:     Worry: Not on file     Inability: Not on file    Transportation needs:     Medical: Not on file     Non-medical: Not on file   Tobacco Use  "   Smoking status: Never Smoker    Smokeless tobacco: Never Used   Substance and Sexual Activity    Alcohol use: Not Currently     Alcohol/week: 1.0 standard drinks     Types: 1 Glasses of wine per week     Frequency: Monthly or less     Comment: occasional     Drug use: No    Sexual activity: Not Currently   Lifestyle    Physical activity:     Days per week: Not on file     Minutes per session: Not on file    Stress: Not at all   Relationships    Social connections:     Talks on phone: Not on file     Gets together: Not on file     Attends Worship service: Not on file     Active member of club or organization: Not on file     Attends meetings of clubs or organizations: Not on file     Relationship status: Not on file   Other Topics Concern    Not on file   Social History Narrative    Health Goals: Increase energy, started anti-fungal diet due to plaque psoriasis, for which he takes Cod liver oil        Breakfast: Smoothies: fruit, banana    Lunch: Lentils (home made) with turkey, spices or beans (red); water (previously black tea)    Dinner: Chicken salad or gyro at [employer]; water    Snacks: None    Eating out: 3x/wk    Water (oz/day): 24 oz/d    Physical Activity: None     Family History   Problem Relation Age of Onset    Melanoma Father     Heart failure Brother         s/p CABG x3    Skin cancer Brother     Heart attack Brother 67       Current Outpatient Medications:     Lactobacillus rhamnosus GG (CULTURELLE) 10 billion cell capsule, Take 1 capsule by mouth once daily., Disp: , Rfl:     Review of patient's allergies indicates:   Allergen Reactions    Keflex [cephalexin] Shortness Of Breath    Terbinafine Palpitations        Review of Systems   All remaining systems negative    Objective:     /68 (BP Location: Left arm, Patient Position: Sitting, BP Method: Medium (Manual))   Temp 98 °F (36.7 °C) (Temporal)   Ht 5' 8" (1.727 m)   Wt 66.4 kg (146 lb 6.2 oz)   BMI 22.26 kg/m² "     Physical Exam  GEN: A&O fully, NAD  PSYC: Normal affect      Lab Results   Component Value Date    WBC 8.37 09/04/2019    HGB 13.3 (L) 09/04/2019    HCT 40.9 09/04/2019     09/04/2019    CHOL 210 (H) 09/04/2019    TRIG 125 09/04/2019    HDL 41 09/04/2019    LDLCALC 144.0 09/04/2019    ALT 23 09/04/2019    AST 34 06/04/2019     09/26/2019    K 4.0 09/26/2019     09/26/2019    CREATININE 1.1 09/26/2019    BUN 23 09/26/2019    CO2 28 09/26/2019    CALCIUM 9.9 09/26/2019    PSA 3.1 06/04/2019       Assessment:      1. Urinary urgency: Likely 2/2 BPH. Will r/o prostatitis/UTI.  Regarding your benign prostatic hypertrophy, I recommend the following dietary modifications:    1. Avoid bread, cereals, eggs, poultry  2. Increase  cooked vegetables, soups, beans, peas, lentils, and citrus fruit  3. Consider sipping on yesika/lemon/honey juice regularly*      *Yesika/lemon/honey Juice          Ingredients (preferably organic & local):    3-5 Yesika roots   3 abner and honey      Preparation:    Yesika root   - Wash   - Chop   - Add to  with an equal proportion of water   - Blend   - Strain   - Pour to fill ¾ of any size container (i.e. glass bottle)    Abner   - Squeeze abner    - Pour juice to fill ¼ of glass bottle    Add honey to glass bottle to taste      Storage & Application    Refrigerate   Enjoy   - Shake & sip every day (also great for cough, congestion, sore throat)   - Avoid drinking >3 oz in one sitting, which can lead to gastrointestinal irritation         Plan:   Urinary urgency      Follow up in about 4 months (around 5/6/2020), or if symptoms worsen or fail to improve, for Annual.    I spent >25 minutes of time with patient 50% or more of which was discussing labs and plans of care.

## 2020-01-07 LAB — BACTERIA UR CULT: NO GROWTH

## 2020-10-23 ENCOUNTER — OFFICE VISIT (OUTPATIENT)
Dept: INTERNAL MEDICINE | Facility: CLINIC | Age: 65
End: 2020-10-23
Payer: COMMERCIAL

## 2020-10-23 ENCOUNTER — TELEPHONE (OUTPATIENT)
Dept: INTERNAL MEDICINE | Facility: CLINIC | Age: 65
End: 2020-10-23

## 2020-10-23 ENCOUNTER — TELEPHONE (OUTPATIENT)
Dept: PEDIATRICS | Facility: CLINIC | Age: 65
End: 2020-10-23

## 2020-10-23 VITALS
DIASTOLIC BLOOD PRESSURE: 66 MMHG | HEIGHT: 68 IN | TEMPERATURE: 98 F | BODY MASS INDEX: 21.77 KG/M2 | WEIGHT: 143.63 LBS | SYSTOLIC BLOOD PRESSURE: 120 MMHG

## 2020-10-23 DIAGNOSIS — R31.9 HEMATURIA, UNSPECIFIED TYPE: Primary | ICD-10-CM

## 2020-10-23 DIAGNOSIS — Z12.11 COLON CANCER SCREENING: ICD-10-CM

## 2020-10-23 DIAGNOSIS — I25.10 CORONARY ARTERY DISEASE INVOLVING NATIVE CORONARY ARTERY OF NATIVE HEART WITHOUT ANGINA PECTORIS: ICD-10-CM

## 2020-10-23 LAB
BILIRUB SERPL-MCNC: NEGATIVE MG/DL
BILIRUB UR QL STRIP: NEGATIVE
BLOOD URINE, POC: NORMAL
CLARITY UR REFRACT.AUTO: CLEAR
CLARITY, POC UA: CLEAR
COLOR UR AUTO: NORMAL
COLOR, POC UA: YELLOW
GLUCOSE UR QL STRIP: NEGATIVE
GLUCOSE UR QL STRIP: NORMAL
HGB UR QL STRIP: NEGATIVE
KETONES UR QL STRIP: NEGATIVE
KETONES UR QL STRIP: NEGATIVE
LEUKOCYTE ESTERASE UR QL STRIP: NEGATIVE
LEUKOCYTE ESTERASE URINE, POC: NORMAL
NITRITE UR QL STRIP: NEGATIVE
NITRITE, POC UA: NEGATIVE
PH UR STRIP: 6 [PH] (ref 5–8)
PH, POC UA: 6
PROT UR QL STRIP: NEGATIVE
PROTEIN, POC: NORMAL
SP GR UR STRIP: 1 (ref 1–1.03)
SPECIFIC GRAVITY, POC UA: 1
URN SPEC COLLECT METH UR: NORMAL
UROBILINOGEN, POC UA: NORMAL

## 2020-10-23 PROCEDURE — 99999 PR PBB SHADOW E&M-EST. PATIENT-LVL III: ICD-10-PCS | Mod: PBBFAC,,, | Performed by: FAMILY MEDICINE

## 2020-10-23 PROCEDURE — 81003 URINALYSIS AUTO W/O SCOPE: CPT

## 2020-10-23 PROCEDURE — 99999 PR PBB SHADOW E&M-EST. PATIENT-LVL III: CPT | Mod: PBBFAC,,, | Performed by: FAMILY MEDICINE

## 2020-10-23 PROCEDURE — 81002 POCT URINE DIPSTICK WITHOUT MICROSCOPE: ICD-10-PCS | Mod: S$GLB,,, | Performed by: FAMILY MEDICINE

## 2020-10-23 PROCEDURE — 99214 OFFICE O/P EST MOD 30 MIN: CPT | Mod: 25,S$GLB,, | Performed by: FAMILY MEDICINE

## 2020-10-23 PROCEDURE — 99214 PR OFFICE/OUTPT VISIT, EST, LEVL IV, 30-39 MIN: ICD-10-PCS | Mod: 25,S$GLB,, | Performed by: FAMILY MEDICINE

## 2020-10-23 PROCEDURE — 81002 URINALYSIS NONAUTO W/O SCOPE: CPT | Mod: S$GLB,,, | Performed by: FAMILY MEDICINE

## 2020-10-23 RX ORDER — ASPIRIN 81 MG/1
81 TABLET ORAL DAILY
COMMUNITY

## 2020-10-23 RX ORDER — TICAGRELOR 90 MG/1
90 TABLET ORAL 2 TIMES DAILY
COMMUNITY
Start: 2020-10-01

## 2020-10-23 RX ORDER — ENALAPRIL MALEATE 5 MG/1
5 TABLET ORAL 2 TIMES DAILY
COMMUNITY
Start: 2020-10-05

## 2020-10-23 RX ORDER — METOPROLOL TARTRATE 25 MG/1
25 TABLET, FILM COATED ORAL 2 TIMES DAILY
COMMUNITY
Start: 2020-10-05

## 2020-10-23 RX ORDER — APIXABAN 5 MG/1
5 TABLET, FILM COATED ORAL 2 TIMES DAILY
COMMUNITY
Start: 2020-10-05

## 2020-10-23 RX ORDER — ROSUVASTATIN CALCIUM 20 MG/1
20 TABLET, COATED ORAL NIGHTLY
COMMUNITY
Start: 2020-10-05

## 2020-10-23 NOTE — TELEPHONE ENCOUNTER
----- Message from Marian Jacome sent at 10/23/2020 10:32 AM CDT -----  Regarding: Requesting an appt for today please  Pt called to schedule an appt for blood in urine for same day appt .        Pt can be reached at [phone].      Thank you!

## 2020-10-23 NOTE — TELEPHONE ENCOUNTER
Spoke with pt he stated he recently had a heart attack on 10/1/20 and was told if he had any blood in his urine/stool to contact his PCP. He was a previous pt of Dr. Skinner; Dr. Skinner informed him that you would be taking over care of his patients therefore he reached out for a same day apt. This morning he woke and isn't sure if blood was in his stool or urine, although he would like to get it checked out to make sure everything is okay. Patient is okay with a virtual. Although your schedule is booked for this afternoon. Please advise if it okay to double book. Pt is waiting for a call back. Thanks

## 2020-10-23 NOTE — TELEPHONE ENCOUNTER
----- Message from Marian Jacome sent at 10/23/2020 10:32 AM CDT -----  Regarding: Requesting an appt for today please  Pt called to schedule an appt for blood in urine for same day appt .        Pt can be reached at [phone].      Thank you!

## 2020-10-23 NOTE — ASSESSMENT & PLAN NOTE
Only trace urine seen today on urine dipstick.  Likely exacerbated by being on anti-platelet regimen.  Will send for microscopic analysis.  If he has this episode again will need to do more diagnostic procedure including possible CT scan.

## 2020-10-23 NOTE — PROGRESS NOTES
Subjective:       Patient ID: Aleksandr Singh is a 65 y.o. male.    Chief Complaint: Hematuria    HPI  Here today with concerns of hematuria.  This morning he woke up and the 1st urination had a lot of blood in the toilet.  At 1st he was unsure whether was related to stool or urine.  Did not have any blood on the toilet paper so did do stented was from the urine.  Since then his urinating about 6 or 7 times and it is all been clear.  Not having any symptoms at all.  No dysuria.  No urinary frequency.  Never had similar issues like this in the past.  Relevant history includes being diagnosed with coronary artery disease on October 1st.  He experienced tightness in chest and was found to have myocardial infarction.  Underwent angiography and 2 stents were placed in the LAD after thrombectomy.  Has been seen Cardiology at [facility].  Currently has a LifeVest secondary to decreased ejection fraction of 35-45%.  He is on aspirin and Brilinta which is new for him    Family History   Problem Relation Age of Onset    Melanoma Father     Heart failure Brother         s/p CABG x3    Skin cancer Brother     Heart attack Brother 67       Current Outpatient Medications:     aspirin (ECOTRIN) 81 MG EC tablet, Take 81 mg by mouth once daily., Disp: , Rfl:     BRILINTA 90 mg tablet, Take 90 mg by mouth 2 (two) times daily., Disp: , Rfl:     enalapril (VASOTEC) 5 MG tablet, Take 5 mg by mouth 2 (two) times daily., Disp: , Rfl:     Lactobacillus rhamnosus GG (CULTURELLE) 10 billion cell capsule, Take 1 capsule by mouth once daily., Disp: , Rfl:     metoprolol tartrate (LOPRESSOR) 25 MG tablet, Take 25 mg by mouth 2 (two) times daily., Disp: , Rfl:     rosuvastatin (CRESTOR) 20 MG tablet, Take 20 mg by mouth nightly., Disp: , Rfl:     ELIQUIS 5 mg Tab, Take 5 mg by mouth 2 (two) times daily., Disp: , Rfl:     Review of Systems   Constitutional: Negative for chills and fever.   Eyes: Negative for visual disturbance.   Respiratory:  "Negative for cough and shortness of breath.    Cardiovascular: Negative for chest pain.   Gastrointestinal: Negative for abdominal pain.   Genitourinary: Positive for hematuria. Negative for difficulty urinating, dysuria, flank pain, frequency, testicular pain and urgency.   Neurological: Negative for dizziness.       Objective:   /66 (BP Location: Right arm, Patient Position: Sitting, BP Method: Large (Manual))   Temp 98.1 °F (36.7 °C) (Temporal)   Ht 5' 8" (1.727 m)   Wt 65.2 kg (143 lb 10.1 oz)   BMI 21.84 kg/m²      Physical Exam  Vitals signs reviewed.   Constitutional:       Appearance: He is well-developed.   HENT:      Head: Normocephalic and atraumatic.   Eyes:      Conjunctiva/sclera: Conjunctivae normal.   Cardiovascular:      Rate and Rhythm: Normal rate.   Pulmonary:      Effort: Pulmonary effort is normal. No respiratory distress.   Skin:     General: Skin is warm and dry.      Findings: No rash.   Neurological:      Mental Status: He is alert and oriented to person, place, and time.      Coordination: Coordination normal.   Psychiatric:         Behavior: Behavior normal.         Assessment & Plan     Problem List Items Addressed This Visit        Cardiac/Vascular    CAD    Overview     S/p stent of LAD on 10/1/20. Follows at [facility]            Renal/    Hematuria - Primary    Current Assessment & Plan     Only trace urine seen today on urine dipstick.  Likely exacerbated by being on anti-platelet regimen.  Will send for microscopic analysis.  If he has this episode again will need to do more diagnostic procedure including possible CT scan.         Relevant Orders    POCT URINE DIPSTICK WITHOUT MICROSCOPE (Completed)    Urinalysis, Reflex to Urine Culture Urine, Clean Catch       GI    Colon cancer screening    Relevant Orders    Cologuard Screening (Multitarget Stool DNA)            No follow-ups on file.    Disclaimer:  This note may have been prepared using voice recognition software, it may " have not been extensively proofed, as such there could be errors within the text such as sound alike errors.

## 2020-11-25 ENCOUNTER — PATIENT OUTREACH (OUTPATIENT)
Dept: ADMINISTRATIVE | Facility: HOSPITAL | Age: 65
End: 2020-11-25

## 2021-04-28 ENCOUNTER — PATIENT MESSAGE (OUTPATIENT)
Dept: RESEARCH | Facility: HOSPITAL | Age: 66
End: 2021-04-28

## 2021-11-03 ENCOUNTER — OFFICE VISIT (OUTPATIENT)
Dept: OTOLARYNGOLOGY | Facility: CLINIC | Age: 66
End: 2021-11-03
Payer: COMMERCIAL

## 2021-11-03 VITALS — TEMPERATURE: 98 F | BODY MASS INDEX: 22.96 KG/M2 | WEIGHT: 151 LBS

## 2021-11-03 DIAGNOSIS — H90.12 CONDUCTIVE HEARING LOSS OF LEFT EAR WITH UNRESTRICTED HEARING OF RIGHT EAR: Primary | ICD-10-CM

## 2021-11-03 PROCEDURE — 99999 PR PBB SHADOW E&M-EST. PATIENT-LVL III: CPT | Mod: PBBFAC,,, | Performed by: OTOLARYNGOLOGY

## 2021-11-03 PROCEDURE — 99999 PR PBB SHADOW E&M-EST. PATIENT-LVL III: ICD-10-PCS | Mod: PBBFAC,,, | Performed by: OTOLARYNGOLOGY

## 2021-11-03 PROCEDURE — 99203 OFFICE O/P NEW LOW 30 MIN: CPT | Mod: S$GLB,,, | Performed by: OTOLARYNGOLOGY

## 2021-11-03 PROCEDURE — 99203 PR OFFICE/OUTPT VISIT, NEW, LEVL III, 30-44 MIN: ICD-10-PCS | Mod: S$GLB,,, | Performed by: OTOLARYNGOLOGY

## 2021-11-03 RX ORDER — AZELASTINE 1 MG/ML
2 SPRAY, METERED NASAL 2 TIMES DAILY
Qty: 30 ML | Refills: 6 | Status: SHIPPED | OUTPATIENT
Start: 2021-11-03 | End: 2022-11-03

## 2021-11-03 RX ORDER — FLUTICASONE PROPIONATE 50 MCG
2 SPRAY, SUSPENSION (ML) NASAL DAILY
Qty: 16 G | Refills: 11 | Status: SHIPPED | OUTPATIENT
Start: 2021-11-03

## 2023-04-06 ENCOUNTER — PATIENT MESSAGE (OUTPATIENT)
Dept: RESEARCH | Facility: HOSPITAL | Age: 68
End: 2023-04-06
Payer: COMMERCIAL

## 2023-05-31 ENCOUNTER — PATIENT MESSAGE (OUTPATIENT)
Dept: RESEARCH | Facility: HOSPITAL | Age: 68
End: 2023-05-31
Payer: COMMERCIAL